# Patient Record
Sex: MALE | Race: WHITE | NOT HISPANIC OR LATINO | URBAN - METROPOLITAN AREA
[De-identification: names, ages, dates, MRNs, and addresses within clinical notes are randomized per-mention and may not be internally consistent; named-entity substitution may affect disease eponyms.]

---

## 2023-10-04 PROBLEM — D67 HEMOPHILIA B (MULTI): Status: ACTIVE | Noted: 2023-10-04

## 2023-10-04 RX ORDER — AMINOCAPROIC ACID 500 MG/1
2 TABLET ORAL EVERY 6 HOURS
COMMUNITY

## 2024-06-18 ENCOUNTER — DOCUMENTATION (OUTPATIENT)
Dept: PEDIATRIC HEMATOLOGY/ONCOLOGY | Facility: EXTERNAL LOCATION | Age: 34
End: 2024-06-18

## 2024-06-18 ENCOUNTER — EXTERNAL FACILITY CLINIC VISIT (OUTPATIENT)
Dept: HEMATOLOGY/ONCOLOGY | Facility: EXTERNAL LOCATION | Age: 34
End: 2024-06-18
Payer: COMMERCIAL

## 2024-06-18 ENCOUNTER — DOCUMENTATION (OUTPATIENT)
Dept: HEMATOLOGY/ONCOLOGY | Facility: EXTERNAL LOCATION | Age: 34
End: 2024-06-18

## 2024-06-18 ENCOUNTER — DOCUMENTATION (OUTPATIENT)
Dept: PEDIATRIC HEMATOLOGY/ONCOLOGY | Facility: EXTERNAL LOCATION | Age: 34
End: 2024-06-18
Payer: COMMERCIAL

## 2024-06-18 VITALS
BODY MASS INDEX: 22.69 KG/M2 | HEART RATE: 76 BPM | SYSTOLIC BLOOD PRESSURE: 136 MMHG | TEMPERATURE: 98.4 F | WEIGHT: 167.55 LBS | DIASTOLIC BLOOD PRESSURE: 85 MMHG | HEIGHT: 72 IN

## 2024-06-18 PROCEDURE — 99213 OFFICE O/P EST LOW 20 MIN: CPT | Performed by: INTERNAL MEDICINE

## 2024-06-18 NOTE — PROGRESS NOTES
Patient in for his annual viisit with Dr. Zelaya and the [facility] team. Patient has no insurance and sees local doctor if needed. Patient reports overall health has been good and bleeding disorder well managed, see medical notes. Patient reports mental & emotional health are good, no depressive episodes no attempts or discussion of suicide, no attempts at self harm or attempting to hurt others, no manic or depressive mood swings, no rage or uncontrolled anger or outbursts, doesn't isolate, engages well with family, peers and community. managing usual and customary stressors. Patient reports eating well and sleeping well through the night, not disrupted. Patient works full time, no work related injuries. Patient reports family is doing well, reports no threats or hazards in the home, basic needs are met, no need for support service referrals at this time.     HAWK Christianson

## 2024-06-18 NOTE — PROGRESS NOTES
"    Hematology office visit      Reason for visit :  Annual Visit     HPI:   Mr. 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   He self-doses as needed. PCP is Dr Avery but has not seen him for several years.  Needed one dose of factor last year - after Lt hip dislocated and he saw a chiropracter who reduced it . Took a dose of Benefix then . Has 2 doses at home - usually self infuses .   He smokes 1ppd X 10 years. 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 No concrete plans for procedures but anticipates needing significant dental work eventually. He has a 5yo daughter who has not been screened. He takes no medications.  12 point review of systems negative except as noted above  medications and allergies reviewed in emr       Physical Exam:  Blood pressure 136/85, pulse 76, temperature 36.9 °C (98.4 °F), temperature source Temporal, height 1.835 m (6' 0.24\"), weight 76 kg (167 lb 8.8 oz).      General: Comfortable , no acute distress  Eyes: No conjunctival pallor / no scleral icterus   ENT: Oropharynx normal. No bleeding, petechiae   Cardiovascular: Regular rate & rhythm , S1/S2, no murmurs, peripheral  pulses +2, no edema of extremities  Pulmonary: CTAB, no respiratory distress. No wheezes, rales, or ronchi  Abdomen: +BS, soft, non-tender, no hepatosplenomegaly   MSK: No joint swelling, normal movements of all extremities. Range of motion- normal.    Assessment/Plan:    Sarath Gonzalez is a 33 y.o. male with pmhx ofmild hemophilia B (baseline level FIX>5%).      06/18/24   He is doing very well without issues bleeding. Has Benefix at home for on demand      Plan:  - continue benefix as needed  - counselled on smoking cessation, avoiding NSAIDs,  - Consider screening his daughter,   - Call us for any planned procedures    Seen and staffed with Dr. Zelaya  who is in agreement with the plan .    Dr. Radha Graham MD FACP  PGY-6,  Hematology " & Oncology Fellow   [facility]  [address]  [place] [id number] [phone]

## 2024-06-18 NOTE — PROGRESS NOTES
"[facility] Nursing note    Multidisciplinary Visit. Patient was seen by [facility] team including physician, nurse, physical therapist, social work, and research specialist.     Patient: Sarath Gonzalez  MRN: [id number]  06/18/24    Interim Bleeding/Injury History  Patient had 0 muscle bleeds, 1 joint bleeds, and 0 other bleeds in past year requiring 1 doses of factor. Patient had pain in his hip joint and stated it \"popped out\". He saw the chiropractor twice and used one dose of factor and the pain resolved.   Patient received 0 preventative doses of factor.   Patient did not  have any ER visits or Hospitalizations  Patient administers factor at home.     Medication Availability  Patient currently uses Benefix  Patient currently does have factor in the home.  Patient currently receives their factor through [facility]  Patient currently does have a PCP.   Patient sees PCP as needed.   Patient has not seen PCP in the past year.    Patient has not seen the dentist in the past year.   Patient does have a dental exam coming up.   Patient does not  have planned surgeries scheduled.   Patient does not have a medic alert.   Patient does not use any herbal supplements.     Emergency Wallet Card  Updated: Yes  Reviewed: Yes  Given: Yes    Additional Review  School/ Vocational: [employer]   Patient has not missed work due to hemophilia.   Patient does wear appropriate safety gear at work and during activities.   Cigarette use: Yes one pack/ day. Patient is interested in quitting.     Patient Choice Policy  Reviewed: Yes  Signed: Yes  Copy given to patient Yes    Study Participation  ATHN  - Offered: Yes  - Consented: No  CDC  - Offered: Yes  - Consented: No  - Labs drawn: No  Covid: Unknown        "

## 2024-07-01 NOTE — PROGRESS NOTES
[facility] PT Consult    Patient: Sarath Gonzalez  MRN: [id number]  07/01/24    Assessment   Sarath is a 33 y.o. with PMHx Hemophilia B who was seen by Physical Therapy in clinic on 6/18/2024. Pt states that he had a L hip injury December 2023. Left hip dislocated and he saw a chiropracter who reduced it. Took x1 dose. States he is doing okay now. Has not had any other injuries or joint/muscle bleeds in the past year.    States that he works as a [place] and wears knee pads if he has to be on his knees for a long period of time. States he does not play any sports.     Plan/Recommendations:  No further Physical Therapy [facility] needs at this time. Physical Therapy to follow during clinic visits.      Subjective   Pt states that he had a L hip injury December 2023. Left hip dislocated and he saw a chiropracter who reduced it. Took x1 dose. States he is doing okay now. Has not had any other injuries or joint/muscle bleeds in the past year.    States that he works as a [place] and wears knee pads if he has to be on his knees for a long period of time. States he does not play any sports.       Past Medical History: No past medical history on file.    Past Surgical History: No past surgical history on file.      Interim Injury History:   December 2023: L hip injury - left hip dislocated and he saw a chiropracter who reduced it.       Antonia Nielsen, PT

## 2024-10-31 ENCOUNTER — TELEPHONE (OUTPATIENT)
Dept: PEDIATRIC HEMATOLOGY/ONCOLOGY | Facility: HOSPITAL | Age: 34
End: 2024-10-31

## 2025-01-07 ENCOUNTER — DOCUMENTATION (OUTPATIENT)
Dept: PEDIATRIC HEMATOLOGY/ONCOLOGY | Facility: HOSPITAL | Age: 35
End: 2025-01-07

## 2025-01-07 NOTE — RESEARCH NOTES
Late entry    An IRB-approved patient joby for [facility] Data Set (03-) was mailed to Sarath Gonzalez on 12/20/2024. The purpose of this joby, dated 10/29/2024, was to inform the study participant that the PI has changed from Dr. Balwinder Waters to Mr. Jesse Adkins CNP. This joby includes updated contact information in the event the patient would like to withdraw permission for the research team to use their protected health information.

## 2025-06-19 ENCOUNTER — DOCUMENTATION (OUTPATIENT)
Dept: HEMATOLOGY/ONCOLOGY | Facility: HOSPITAL | Age: 35
End: 2025-06-19

## 2025-06-19 ENCOUNTER — EXTERNAL FACILITY CLINIC VISIT (OUTPATIENT)
Dept: HEMATOLOGY/ONCOLOGY | Facility: EXTERNAL LOCATION | Age: 35
End: 2025-06-19

## 2025-06-19 VITALS
WEIGHT: 167 LBS | RESPIRATION RATE: 18 BRPM | OXYGEN SATURATION: 98 % | DIASTOLIC BLOOD PRESSURE: 57 MMHG | TEMPERATURE: 98.2 F | HEART RATE: 63 BPM | HEIGHT: 73 IN | SYSTOLIC BLOOD PRESSURE: 104 MMHG | BODY MASS INDEX: 22.13 KG/M2

## 2025-06-19 DIAGNOSIS — D67 MILD HEMOPHILIA B: Primary | ICD-10-CM

## 2025-06-19 PROCEDURE — 99213 OFFICE O/P EST LOW 20 MIN: CPT | Performed by: STUDENT IN AN ORGANIZED HEALTH CARE EDUCATION/TRAINING PROGRAM

## 2025-06-19 NOTE — PROGRESS NOTES
"Patient ID: Sarath Gonzalez is a 34 y.o. male.  Referring Physician: No referring provider defined for this encounter.  Primary Care Provider: Sarath Avery DO  Visit Type: Follow Up  Diagnosis: Mild hemophilia B (5%)      Subjective    HPI  34 y.o. male with no significant PMH.     Mild hemophilia B (5%):   No bleeds in last 1.5yrs, hip injury before that.   Infuses himself. Benefix at home. Not sure about amicar.   Has a PCP, . Has a dentist as well.   No herbals or supplements.   Age appropriate cancer screening: N/A   Social history: [place], 5 4 boys and 1 girl, daughter is almost 6yrs not tested yet.     Review of Systems - Oncology  10 point review of systems negative except as stated in HPI    Objective   Surgical History[1]    Oncology History    No history exists.       BSA: 1.97 meters squared /57 (BP Location: Left arm)   Pulse 63   Temp 36.8 °C (98.2 °F)   Resp 18   Ht 1.85 m (6' 0.84\")   Wt 75.8 kg (167 lb)   SpO2 98%   BMI 22.13 kg/m²   Physical Exam  Vitals reviewed.   Constitutional:       General: He is not in acute distress.     Appearance: He is not toxic-appearing.   HENT:      Head: Normocephalic and atraumatic.   Eyes:      Comments: No pallor, has some injection   Neck:      Comments: No palpable cervical or axillary adenopathy   Cardiovascular:      Rate and Rhythm: Normal rate.   Pulmonary:      Effort: Pulmonary effort is normal.   Abdominal:      General: There is no distension.      Palpations: Abdomen is soft.      Tenderness: There is no abdominal tenderness.   Musculoskeletal:      Comments: No joint issues for today    Neurological:      General: No focal deficit present.      Mental Status: He is oriented to person, place, and time.   Psychiatric:         Mood and Affect: Mood normal.         Assessment/Plan    34 y.o. male with no significant PMH.     # Mild hemophilia B (5%): no recent bleeds in last year, doses himself PRN, no problem joints. Has dentist " and PCP.   Plan:  - needs a benefix script 100U/kg  - has amicar   - no planned surgeries   - follow up next: yearly   - labs prior to follow up: none       Severo Buckner MD         [1] No past surgical history on file.

## 2025-06-20 DIAGNOSIS — D67 HEMOPHILIA B (MULTI): Primary | ICD-10-CM
